# Patient Record
Sex: MALE | Race: WHITE | NOT HISPANIC OR LATINO | Employment: FULL TIME | URBAN - METROPOLITAN AREA
[De-identification: names, ages, dates, MRNs, and addresses within clinical notes are randomized per-mention and may not be internally consistent; named-entity substitution may affect disease eponyms.]

---

## 2024-04-25 ENCOUNTER — OFFICE VISIT (OUTPATIENT)
Dept: URGENT CARE | Facility: CLINIC | Age: 24
End: 2024-04-25
Payer: COMMERCIAL

## 2024-04-25 ENCOUNTER — HOSPITAL ENCOUNTER (OUTPATIENT)
Dept: RADIOLOGY | Facility: CLINIC | Age: 24
Discharge: HOME | End: 2024-04-25
Payer: COMMERCIAL

## 2024-04-25 VITALS
WEIGHT: 200 LBS | OXYGEN SATURATION: 98 % | SYSTOLIC BLOOD PRESSURE: 129 MMHG | RESPIRATION RATE: 15 BRPM | BODY MASS INDEX: 28.63 KG/M2 | HEIGHT: 70 IN | TEMPERATURE: 97.6 F | DIASTOLIC BLOOD PRESSURE: 84 MMHG | HEART RATE: 63 BPM

## 2024-04-25 DIAGNOSIS — G89.29 CHRONIC BILATERAL THORACIC BACK PAIN: ICD-10-CM

## 2024-04-25 DIAGNOSIS — M62.830 MUSCLE SPASM OF BACK: Primary | ICD-10-CM

## 2024-04-25 DIAGNOSIS — M54.6 CHRONIC BILATERAL THORACIC BACK PAIN: ICD-10-CM

## 2024-04-25 PROCEDURE — 72072 X-RAY EXAM THORAC SPINE 3VWS: CPT | Performed by: RADIOLOGY

## 2024-04-25 PROCEDURE — 99214 OFFICE O/P EST MOD 30 MIN: CPT

## 2024-04-25 PROCEDURE — 72072 X-RAY EXAM THORAC SPINE 3VWS: CPT

## 2024-04-25 RX ORDER — CYCLOBENZAPRINE HCL 5 MG
5 TABLET ORAL 3 TIMES DAILY
Qty: 21 TABLET | Refills: 0 | Status: SHIPPED | OUTPATIENT
Start: 2024-04-25 | End: 2024-05-02

## 2024-04-25 NOTE — LETTER
April 25, 2024     Patient: Wander Hoffman   YOB: 2000   Date of Visit: 4/25/2024       To Whom It May Concern:    Wander Hoffman was seen in my clinic on 4/25/2024 at 3:40 pm. Please excuse Wander for his absence from work on this day to make the appointment.    If you have any questions or concerns, please don't hesitate to call.         Sincerely,         Libia Jean PA-C        CC: No Recipients

## 2024-04-25 NOTE — LETTER
April 25, 2024     Patient: Wander Hoffman   YOB: 2000   Date of Visit: 4/25/2024       To Whom It May Concern:    It is my medical opinion that Wander Hoffman may return to light duty immediately with the following restrictions: no lifting over 15 pounds x 1 week .    If you have any questions or concerns, please don't hesitate to call.         Sincerely,        Libia Jean PA-C    CC: No Recipients

## 2024-04-25 NOTE — PROGRESS NOTES
[facility]RGENT C[facility] NOTE:      Name: Wander Hoffman, 24 y.o.    CSN:[id number]   MRN:[id number]    PCP: No primary care provider on file.    ALL:  No Known Allergies    History:    Chief Complaint: Back Pain (Pain in mid back for 1 week. Pt states he works as a  and lifts heavy material. )    Encounter Date: 4/25/2024      HPI: The history was obtained from the patient. Wander is a 24 y.o. male, who presents with a chief complaint of Back Pain (Pain in mid back for 1 week. Pt states he works as a  and lifts heavy material. ). States the issue has been ongoing for a couple years but has worsened over the last 5 days.  He states he works at a job where he is repetitively lifting 50 to 60 pounds.  He does notice that this aggravates his injury.  He states twisting also aggravates the area.  He feels like the pain is directly on his spine.  He has been taking ibuprofen and Tylenol with no relief.  No radiculopathy.  He denies urinary or bowel changes or Perineal numbness.  He denies any headaches, nausea or vomiting, chest pain, shortness of breath, abdominal pain.    PMHx:    No past medical history on file.           Current Outpatient Medications   Medication Sig Dispense Refill    cyclobenzaprine (Flexeril) 5 mg tablet Take 1 tablet (5 mg) by mouth 3 times a day for 7 days. YOU ARE BEING GIVEN A SEDATIVE. 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         PMSx:  No past surgical history on file.    Fam Hx: No family history on file.    SOC. Hx:     Social History     Socioeconomic History    Marital status: Single     Spouse name: Not on file    Number of children: Not on file    Years of education: Not on file    Highest education level: Not on file   Occupational History    Not on file   Tobacco Use    Smoking status: Not on file    Smokeless tobacco: Not on file   Substance and Sexual  Activity    Alcohol use: Not on file    Drug use: Not on file    Sexual activity: Not on file   Other Topics Concern    Not on file   Social History Narrative    Not on file     Social Determinants of Health     Financial Resource Strain: Not on file   Food Insecurity: Not on file   Transportation Needs: Not on file   Physical Activity: Not on file   Stress: Not on file   Social Connections: Not on file   Intimate Partner Violence: Not on file   Housing Stability: Not on file         Vitals:    04/25/24 1546   BP: 129/84   Pulse: 63   Resp: 15   Temp: 36.4 °C (97.6 °F)   SpO2: 98%     90.7 kg (200 lb)          Physical Exam  Vitals reviewed.   Constitutional:       Appearance: Normal appearance.   HENT:      Right Ear: Tympanic membrane normal.      Left Ear: Tympanic membrane normal.      Nose: Nose normal.      Mouth/Throat:      Mouth: Mucous membranes are moist.      Pharynx: Oropharynx is clear.   Eyes:      Conjunctiva/sclera: Conjunctivae normal.      Pupils: Pupils are equal, round, and reactive to light.   Cardiovascular:      Rate and Rhythm: Normal rate and regular rhythm.      Pulses: Normal pulses.      Heart sounds: Normal heart sounds.   Pulmonary:      Effort: Pulmonary effort is normal.      Breath sounds: Normal breath sounds.   Musculoskeletal:        Back:       Comments: Tender to palpation in the paraspinal as well as spinous process region of T-spine.  There is no loss of sensation.  No erythema, edema, ecchymosis.   Skin:     General: Skin is warm.   Neurological:      General: No focal deficit present.      Mental Status: He is alert and oriented to person, place, and time.   Psychiatric:         Mood and Affect: Mood normal.         Behavior: Behavior normal.         I did personally review Wander's past medical history, surgical history, social history, as well as family history (when relevant).  In this case, I also oversaw the his drug management by reviewing his medication list, allergy  list, as well as the medications that I prescribed during the UC course and/or recommended as an out-patient (including possible OTC medications such as acetaminophen, NSAIDs , etc).    After reviewing the items above, I did look at previous medical documentation, such as recent hospitalizations, office visits, and/or recent consultations with PCP/specialist.                          SDOH:   Another factor that I considered in Wander's care was his Social Determinants of Health (SDOH). During this UC encounter, he did not have social determinants of health. Those SDOH influencing Wander's care are: none    LABORATORY @ RADIOLOGICAL IMAGING (if done):   Narrative & Impression   Interpreted By:  Alexandro Ji,   STUDY:  XR THORACIC SPINE 3 VIEWS; ;  4/25/2024 4:15 pm      INDICATION:  Signs/Symptoms:back pain.      COMPARISON:  None.      ACCESSION NUMBER(S):  [id number]      ORDERING CLINICIAN:  CHANA DAVILA      FINDINGS:  Please see the impression.      IMPRESSION:  No acute fracture or traumatic subluxation in the thoracic spine.      No endplate erosion.      Clear visualized lungs          MACRO:  None      Signed by: Alexandro Ji 4/25/2024 4:17 PM  Dictation workstation:   [id number]        COURSE/MEDICAL DECISION MAKING:    Wander is a 24 y.o., who presents with a working diagnosis of   1. Muscle spasm of back    2. Chronic bilateral thoracic back pain      Wander was seen today for back pain.  Diagnoses and all orders for this visit:  Muscle spasm of back (Primary)  -     cyclobenzaprine (Flexeril) 5 mg tablet; Take 1 tablet (5 mg) by mouth 3 times a day for 7 days. YOU ARE BEING GIVEN A SEDATIVE. 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  Patient is likely experiencing muscle spasms of the thoracic paraspinal muscles.  Continue to use ibuprofen and Tylenol for pain.   Icing and heat may help.  I did offer a physical therapy referral for patient today in which patient declined.  Discussed with patient if he continues to have pain he may need a orthopedics referral for follow-up.  Discussed with patient guidelines and use of Flexeril.  Patient voices understanding.    Discussed with patient if he develops any loss of sensation in the perineal area, changes in urination or defecation, pain into his lower extremities, or worsening in his condition in any way he should report to the ER immediately.  Patient was agreeable to this plan.      Cora Jean PA-C   Advanced Practice Provider  [facility] URGENT CARE